# Patient Record
Sex: FEMALE | Race: WHITE | NOT HISPANIC OR LATINO | Employment: OTHER | URBAN - METROPOLITAN AREA
[De-identification: names, ages, dates, MRNs, and addresses within clinical notes are randomized per-mention and may not be internally consistent; named-entity substitution may affect disease eponyms.]

---

## 2018-07-05 ENCOUNTER — HISTORICAL (OUTPATIENT)
Dept: LAB | Facility: HOSPITAL | Age: 63
End: 2018-07-05

## 2018-07-05 ENCOUNTER — HISTORICAL (OUTPATIENT)
Dept: ADMINISTRATIVE | Facility: HOSPITAL | Age: 63
End: 2018-07-05

## 2018-07-05 LAB
ABS NEUT (OLG): 2.7
ALBUMIN SERPL-MCNC: 5 GM/DL (ref 3.4–5)
ALBUMIN/GLOB SERPL: 2.08 {RATIO} (ref 1.5–2.5)
ALP SERPL-CCNC: 43 UNIT/L (ref 38–126)
ALT SERPL-CCNC: 78 UNIT/L (ref 7–52)
APPEARANCE, UA: CLEAR
AST SERPL-CCNC: 37 UNIT/L (ref 15–37)
BACTERIA #/AREA URNS AUTO: ABNORMAL /HPF
BILIRUB SERPL-MCNC: 0.7 MG/DL (ref 0.2–1)
BILIRUB UR QL STRIP: NEGATIVE MG/DL
BILIRUBIN DIRECT+TOT PNL SERPL-MCNC: 0.1 MG/DL (ref 0–0.5)
BILIRUBIN DIRECT+TOT PNL SERPL-MCNC: 0.6 MG/DL
BUN SERPL-MCNC: 12 MG/DL (ref 7–18)
CALCIUM SERPL-MCNC: 9.7 MG/DL (ref 8.5–10)
CHLORIDE SERPL-SCNC: 102 MMOL/L (ref 98–107)
CHOLEST SERPL-MCNC: 182 MG/DL (ref 0–200)
CHOLEST/HDLC SERPL: 3.7 {RATIO}
CO2 SERPL-SCNC: 29 MMOL/L (ref 21–32)
COLOR UR: YELLOW
CREAT SERPL-MCNC: 0.89 MG/DL (ref 0.6–1.3)
ERYTHROCYTE [DISTWIDTH] IN BLOOD BY AUTOMATED COUNT: 12 % (ref 11.5–17)
GLOBULIN SER-MCNC: 2.4 GM/DL (ref 1.2–3)
GLUCOSE (UA): NEGATIVE MG/DL
GLUCOSE SERPL-MCNC: 109 MG/DL (ref 74–106)
HAV IGM SERPL QL IA: NEGATIVE
HBV CORE IGM SERPL QL IA: NEGATIVE
HBV SURFACE AG SERPL QL IA: NEGATIVE
HCT VFR BLD AUTO: 43.7 % (ref 37–47)
HCV AB SERPL QL IA: NEGATIVE
HDLC SERPL-MCNC: 49 MG/DL (ref 35–60)
HEPATITIS PANEL INTERP: NORMAL
HGB BLD-MCNC: 14.8 GM/DL (ref 12–16)
HGB UR QL STRIP: ABNORMAL UNIT/L
KETONES UR QL STRIP: NEGATIVE MG/DL
LDLC SERPL CALC-MCNC: 104 MG/DL (ref 0–129)
LEUKOCYTE ESTERASE UR QL STRIP: ABNORMAL UNIT/L
LYMPHOCYTES # BLD AUTO: 2.5 X10(3)/MCL (ref 0.6–3.4)
LYMPHOCYTES NFR BLD AUTO: 42.6 % (ref 13–40)
MCH RBC QN AUTO: 30.6 PG (ref 27–31.2)
MCHC RBC AUTO-ENTMCNC: 34 GM/DL (ref 32–36)
MCV RBC AUTO: 90 FL (ref 80–94)
MONOCYTES # BLD AUTO: 0.6 X10(3)/MCL (ref 0–1.8)
MONOCYTES NFR BLD AUTO: 11.1 % (ref 0.1–24)
NEUTROPHILS NFR BLD AUTO: 46.3 % (ref 47–80)
NITRITE UR QL STRIP.AUTO: NEGATIVE
PH UR STRIP: 5.5 [PH]
PLATELET # BLD AUTO: 252 X10(3)/MCL (ref 130–400)
PMV BLD AUTO: 8.9 FL
POTASSIUM SERPL-SCNC: 4.4 MMOL/L (ref 3.5–5.1)
PROT SERPL-MCNC: 7.4 GM/DL (ref 6.4–8.2)
PROT UR QL STRIP: NEGATIVE MG/DL
RBC # BLD AUTO: 4.83 X10(6)/MCL (ref 4.2–5.4)
RBC #/AREA URNS HPF: ABNORMAL /HPF
SODIUM SERPL-SCNC: 141 MMOL/L (ref 136–145)
SP GR UR STRIP: 1.01
SQUAMOUS EPITHELIAL, UA: ABNORMAL /LPF
TRIGL SERPL-MCNC: 112 MG/DL (ref 30–150)
TSH SERPL-ACNC: 2.92 MIU/ML (ref 0.35–4.94)
UROBILINOGEN UR STRIP-ACNC: 0.2 MG/DL
VLDLC SERPL CALC-MCNC: 22.4 MG/DL
WBC # SPEC AUTO: 5.8 X10(3)/MCL (ref 4.5–11.5)
WBC #/AREA URNS AUTO: ABNORMAL /[HPF]

## 2018-11-06 ENCOUNTER — HISTORICAL (OUTPATIENT)
Dept: ADMINISTRATIVE | Facility: HOSPITAL | Age: 63
End: 2018-11-06

## 2018-11-06 LAB
ALBUMIN SERPL-MCNC: 4.4 GM/DL (ref 3.4–5)
ALBUMIN/GLOB SERPL: 2 {RATIO} (ref 1.5–2.5)
ALP SERPL-CCNC: 46 UNIT/L (ref 38–126)
ALT SERPL-CCNC: 57 UNIT/L (ref 7–52)
AST SERPL-CCNC: 31 UNIT/L (ref 15–37)
BILIRUB SERPL-MCNC: 0.7 MG/DL (ref 0.2–1)
BILIRUBIN DIRECT+TOT PNL SERPL-MCNC: 0.2 MG/DL (ref 0–0.5)
BILIRUBIN DIRECT+TOT PNL SERPL-MCNC: 0.5 MG/DL
BUN SERPL-MCNC: 13 MG/DL (ref 7–18)
CALCIUM SERPL-MCNC: 9.3 MG/DL (ref 8.5–10)
CHLORIDE SERPL-SCNC: 104 MMOL/L (ref 98–107)
CHOLEST SERPL-MCNC: 161 MG/DL (ref 0–200)
CHOLEST/HDLC SERPL: 3 {RATIO}
CO2 SERPL-SCNC: 28 MMOL/L (ref 21–32)
CREAT SERPL-MCNC: 0.93 MG/DL (ref 0.6–1.3)
EST. AVERAGE GLUCOSE BLD GHB EST-MCNC: 120 MG/DL
GLOBULIN SER-MCNC: 2.2 GM/DL (ref 1.2–3)
GLUCOSE SERPL-MCNC: 105 MG/DL (ref 74–106)
HBA1C MFR BLD: 5.8 % (ref 4.4–6.4)
HDLC SERPL-MCNC: 53 MG/DL (ref 35–60)
LDLC SERPL CALC-MCNC: 85 MG/DL (ref 0–129)
POTASSIUM SERPL-SCNC: 4.3 MMOL/L (ref 3.5–5.1)
PROT SERPL-MCNC: 6.6 GM/DL (ref 6.4–8.2)
SODIUM SERPL-SCNC: 140 MMOL/L (ref 136–145)
TRIGL SERPL-MCNC: 113 MG/DL (ref 30–150)
VLDLC SERPL CALC-MCNC: 22.6 MG/DL

## 2019-01-09 LAB — CRC RECOMMENDATION EXT: NORMAL

## 2019-07-02 ENCOUNTER — HISTORICAL (OUTPATIENT)
Dept: ADMINISTRATIVE | Facility: HOSPITAL | Age: 64
End: 2019-07-02

## 2019-07-02 LAB
ALBUMIN SERPL-MCNC: 4.3 GM/DL (ref 3.4–5)
ALBUMIN/GLOB SERPL: 2.15 {RATIO} (ref 1.5–2.5)
ALP SERPL-CCNC: 44 UNIT/L (ref 38–126)
ALT SERPL-CCNC: 72 UNIT/L (ref 7–52)
AST SERPL-CCNC: 35 UNIT/L (ref 15–37)
BILIRUB SERPL-MCNC: 0.6 MG/DL (ref 0.2–1)
BILIRUBIN DIRECT+TOT PNL SERPL-MCNC: 0.1 MG/DL (ref 0–0.5)
BILIRUBIN DIRECT+TOT PNL SERPL-MCNC: 0.5 MG/DL
BUN SERPL-MCNC: 14 MG/DL (ref 7–18)
CALCIUM SERPL-MCNC: 9.3 MG/DL (ref 8.5–10)
CHLORIDE SERPL-SCNC: 104 MMOL/L (ref 98–107)
CHOLEST SERPL-MCNC: 172 MG/DL (ref 0–200)
CHOLEST/HDLC SERPL: 3.9 {RATIO}
CO2 SERPL-SCNC: 30 MMOL/L (ref 21–32)
CREAT SERPL-MCNC: 0.88 MG/DL (ref 0.6–1.3)
EST. AVERAGE GLUCOSE BLD GHB EST-MCNC: 120 MG/DL
GLOBULIN SER-MCNC: 2 GM/DL (ref 1.2–3)
GLUCOSE SERPL-MCNC: 109 MG/DL (ref 74–106)
HBA1C MFR BLD: 5.8 % (ref 4.4–6.4)
HDLC SERPL-MCNC: 44 MG/DL (ref 35–60)
LDLC SERPL CALC-MCNC: 98 MG/DL (ref 0–129)
POTASSIUM SERPL-SCNC: 4.6 MMOL/L (ref 3.5–5.1)
PROT SERPL-MCNC: 6.3 GM/DL (ref 6.4–8.2)
SODIUM SERPL-SCNC: 141 MMOL/L (ref 136–145)
TRIGL SERPL-MCNC: 140 MG/DL (ref 30–150)
VLDLC SERPL CALC-MCNC: 28 MG/DL

## 2020-08-31 ENCOUNTER — HISTORICAL (OUTPATIENT)
Dept: ADMINISTRATIVE | Facility: HOSPITAL | Age: 65
End: 2020-08-31

## 2020-08-31 LAB
ALBUMIN SERPL-MCNC: 4.8 GM/DL (ref 3.4–5)
ALBUMIN/GLOB SERPL: 2.18 {RATIO} (ref 1.5–2.5)
ALP SERPL-CCNC: 63 UNIT/L (ref 38–126)
ALT SERPL-CCNC: 80 UNIT/L (ref 7–52)
AST SERPL-CCNC: 39 UNIT/L (ref 15–37)
BILIRUB SERPL-MCNC: 0.5 MG/DL (ref 0.2–1)
BILIRUBIN DIRECT+TOT PNL SERPL-MCNC: 0.1 MG/DL (ref 0–0.5)
BILIRUBIN DIRECT+TOT PNL SERPL-MCNC: 0.4 MG/DL
BUN SERPL-MCNC: 14 MG/DL (ref 7–18)
CALCIUM SERPL-MCNC: 9.7 MG/DL (ref 8.5–10)
CHLORIDE SERPL-SCNC: 100 MMOL/L (ref 98–107)
CHOLEST SERPL-MCNC: 172 MG/DL (ref 0–200)
CHOLEST/HDLC SERPL: 3.7 {RATIO}
CO2 SERPL-SCNC: 26 MMOL/L (ref 21–32)
CREAT SERPL-MCNC: 0.82 MG/DL (ref 0.6–1.3)
GLOBULIN SER-MCNC: 2.2 GM/DL (ref 1.2–3)
GLUCOSE SERPL-MCNC: 103 MG/DL (ref 74–106)
HDLC SERPL-MCNC: 47 MG/DL (ref 35–60)
LDLC SERPL CALC-MCNC: 106 MG/DL (ref 0–129)
POTASSIUM SERPL-SCNC: 4.2 MMOL/L (ref 3.5–5.1)
PROT SERPL-MCNC: 7 GM/DL (ref 6.4–8.2)
SODIUM SERPL-SCNC: 137 MMOL/L (ref 136–145)
TRIGL SERPL-MCNC: 310 MG/DL (ref 30–150)
VLDLC SERPL CALC-MCNC: 62 MG/DL

## 2020-10-12 ENCOUNTER — HISTORICAL (OUTPATIENT)
Dept: ADMINISTRATIVE | Facility: HOSPITAL | Age: 65
End: 2020-10-12

## 2020-10-12 LAB
ALBUMIN SERPL-MCNC: 4.7 GM/DL (ref 3.4–5)
ALBUMIN/GLOB SERPL: 2.04 {RATIO} (ref 1.5–2.5)
ALP SERPL-CCNC: 116 UNIT/L (ref 38–126)
ALT SERPL-CCNC: 60 UNIT/L (ref 7–52)
AST SERPL-CCNC: 34 UNIT/L (ref 15–37)
BILIRUB SERPL-MCNC: 0.6 MG/DL (ref 0.2–1)
BILIRUBIN DIRECT+TOT PNL SERPL-MCNC: 0.2 MG/DL (ref 0–0.5)
BILIRUBIN DIRECT+TOT PNL SERPL-MCNC: 0.4 MG/DL
BUN SERPL-MCNC: 13 MG/DL (ref 7–18)
CALCIUM SERPL-MCNC: 9.9 MG/DL (ref 8.5–10)
CHLORIDE SERPL-SCNC: 101 MMOL/L (ref 98–107)
CO2 SERPL-SCNC: 27 MMOL/L (ref 21–32)
CREAT SERPL-MCNC: 0.87 MG/DL (ref 0.6–1.3)
GLOBULIN SER-MCNC: 2.3 GM/DL (ref 1.2–3)
GLUCOSE SERPL-MCNC: 122 MG/DL (ref 74–106)
POTASSIUM SERPL-SCNC: 4.2 MMOL/L (ref 3.5–5.1)
PROT SERPL-MCNC: 7 GM/DL (ref 6.4–8.2)
SODIUM SERPL-SCNC: 138 MMOL/L (ref 136–145)

## 2021-03-31 ENCOUNTER — HISTORICAL (OUTPATIENT)
Dept: ADMINISTRATIVE | Facility: HOSPITAL | Age: 66
End: 2021-03-31

## 2021-03-31 LAB
ALBUMIN SERPL-MCNC: 4.5 GM/DL (ref 3.4–5)
ALBUMIN/GLOB SERPL: 2.25 {RATIO} (ref 1.5–2.5)
ALP SERPL-CCNC: 57 UNIT/L (ref 38–126)
ALT SERPL-CCNC: 48 UNIT/L (ref 7–52)
AST SERPL-CCNC: 30 UNIT/L (ref 15–37)
BILIRUB SERPL-MCNC: 0.7 MG/DL (ref 0.2–1)
BILIRUBIN DIRECT+TOT PNL SERPL-MCNC: 0.2 MG/DL (ref 0–0.5)
BILIRUBIN DIRECT+TOT PNL SERPL-MCNC: 0.5 MG/DL
BUN SERPL-MCNC: 15 MG/DL (ref 7–18)
CALCIUM SERPL-MCNC: 9.6 MG/DL (ref 8.5–10)
CHLORIDE SERPL-SCNC: 104 MMOL/L (ref 98–107)
CHOLEST SERPL-MCNC: 171 MG/DL (ref 0–200)
CHOLEST/HDLC SERPL: 3.2 {RATIO}
CO2 SERPL-SCNC: 30 MMOL/L (ref 21–32)
CREAT SERPL-MCNC: 0.96 MG/DL (ref 0.6–1.3)
GLOBULIN SER-MCNC: 2 GM/DL (ref 1.2–3)
GLUCOSE SERPL-MCNC: 107 MG/DL (ref 74–106)
HDLC SERPL-MCNC: 53 MG/DL (ref 35–60)
LDLC SERPL CALC-MCNC: 96 MG/DL (ref 0–129)
POTASSIUM SERPL-SCNC: 4.8 MMOL/L (ref 3.5–5.1)
PROT SERPL-MCNC: 6.5 GM/DL (ref 6.4–8.2)
SODIUM SERPL-SCNC: 140 MMOL/L (ref 136–145)
TRIGL SERPL-MCNC: 129 MG/DL (ref 30–150)
VLDLC SERPL CALC-MCNC: 25.8 MG/DL

## 2021-09-17 ENCOUNTER — HISTORICAL (OUTPATIENT)
Dept: ADMINISTRATIVE | Facility: HOSPITAL | Age: 66
End: 2021-09-17

## 2021-09-17 LAB
ALBUMIN SERPL-MCNC: 4.4 GM/DL (ref 3.4–5)
ALBUMIN/GLOB SERPL: 2 {RATIO} (ref 1.5–2.5)
ALP SERPL-CCNC: 49 UNIT/L (ref 38–126)
ALT SERPL-CCNC: 54 UNIT/L (ref 7–52)
AST SERPL-CCNC: 30 UNIT/L (ref 15–37)
BILIRUB SERPL-MCNC: 0.6 MG/DL (ref 0.2–1)
BILIRUBIN DIRECT+TOT PNL SERPL-MCNC: 0.1 MG/DL (ref 0–0.5)
BILIRUBIN DIRECT+TOT PNL SERPL-MCNC: 0.5 MG/DL
BUN SERPL-MCNC: 14 MG/DL (ref 7–18)
CALCIUM SERPL-MCNC: 9.7 MG/DL (ref 8.5–10)
CHLORIDE SERPL-SCNC: 103 MMOL/L (ref 98–107)
CHOLEST SERPL-MCNC: 172 MG/DL (ref 0–200)
CHOLEST/HDLC SERPL: 3.7 {RATIO}
CO2 SERPL-SCNC: 31 MMOL/L (ref 21–32)
CREAT SERPL-MCNC: 0.97 MG/DL (ref 0.6–1.3)
GLOBULIN SER-MCNC: 2.2 GM/DL (ref 1.2–3)
GLUCOSE SERPL-MCNC: 109 MG/DL (ref 74–106)
HDLC SERPL-MCNC: 46 MG/DL (ref 35–60)
LDLC SERPL CALC-MCNC: 101 MG/DL (ref 0–129)
POTASSIUM SERPL-SCNC: 4.7 MMOL/L (ref 3.5–5.1)
PROT SERPL-MCNC: 6.6 GM/DL (ref 6.4–8.2)
SODIUM SERPL-SCNC: 141 MMOL/L (ref 136–145)
TRIGL SERPL-MCNC: 131 MG/DL (ref 30–150)
VLDLC SERPL CALC-MCNC: 26.2 MG/DL

## 2021-11-24 ENCOUNTER — HISTORICAL (OUTPATIENT)
Dept: ADMINISTRATIVE | Facility: HOSPITAL | Age: 66
End: 2021-11-24

## 2021-11-24 LAB
ABS NEUT (OLG): 2.5 X10(3)/MCL (ref 2.1–9.2)
APPEARANCE, UA: CLEAR
BACTERIA #/AREA URNS AUTO: NORMAL /HPF
BILIRUB UR QL STRIP: NEGATIVE MG/DL
COLOR UR: YELLOW
ERYTHROCYTE [DISTWIDTH] IN BLOOD BY AUTOMATED COUNT: 11.8 % (ref 11.5–17)
GLUCOSE (UA): NEGATIVE MG/DL
HCT VFR BLD AUTO: 38.6 % (ref 37–47)
HGB BLD-MCNC: 12.7 GM/DL (ref 12–16)
HGB UR QL STRIP: NEGATIVE UNIT/L
KETONES UR QL STRIP: NEGATIVE MG/DL
LEUKOCYTE ESTERASE UR QL STRIP: NORMAL UNIT/L
LYMPHOCYTES # BLD AUTO: 2 X10(3)/MCL (ref 0.6–3.4)
LYMPHOCYTES NFR BLD AUTO: 42.5 % (ref 13–40)
MCH RBC QN AUTO: 30.2 PG (ref 27–31.2)
MCHC RBC AUTO-ENTMCNC: 33 GM/DL (ref 32–36)
MCV RBC AUTO: 92 FL (ref 80–94)
MONOCYTES # BLD AUTO: 0.2 X10(3)/MCL (ref 0.1–1.3)
MONOCYTES NFR BLD AUTO: 4.4 % (ref 0.1–24)
NEUTROPHILS NFR BLD AUTO: 53.1 % (ref 47–80)
NITRITE UR QL STRIP.AUTO: NEGATIVE
PH UR STRIP: 5.5 [PH]
PLATELET # BLD AUTO: 223 X10(3)/MCL (ref 130–400)
PMV BLD AUTO: 8.9 FL (ref 9.4–12.4)
PROT UR QL STRIP: NEGATIVE MG/DL
RBC # BLD AUTO: 4.21 X10(6)/MCL (ref 4.2–5.4)
RBC #/AREA URNS HPF: NORMAL /HPF
SP GR UR STRIP: 1.01
SQUAMOUS EPITHELIAL, UA: NORMAL /LPF
UROBILINOGEN UR STRIP-ACNC: 0.2 MG/DL
WBC # SPEC AUTO: 4.7 X10(3)/MCL (ref 4.5–11.5)
WBC #/AREA URNS AUTO: NORMAL /[HPF]

## 2022-04-10 ENCOUNTER — HISTORICAL (OUTPATIENT)
Dept: ADMINISTRATIVE | Facility: HOSPITAL | Age: 67
End: 2022-04-10

## 2022-04-11 ENCOUNTER — HISTORICAL (OUTPATIENT)
Dept: ADMINISTRATIVE | Facility: HOSPITAL | Age: 67
End: 2022-04-11

## 2022-04-28 VITALS
SYSTOLIC BLOOD PRESSURE: 120 MMHG | BODY MASS INDEX: 29.24 KG/M2 | WEIGHT: 175.5 LBS | DIASTOLIC BLOOD PRESSURE: 70 MMHG | HEIGHT: 65 IN

## 2022-04-28 VITALS
BODY MASS INDEX: 29.24 KG/M2 | SYSTOLIC BLOOD PRESSURE: 120 MMHG | HEIGHT: 65 IN | WEIGHT: 175.5 LBS | DIASTOLIC BLOOD PRESSURE: 70 MMHG

## 2022-05-03 NOTE — HISTORICAL [facility] CERNER
This is a historical note converted from Nas. Formatting and pictures may have been removed.  Please reference Nas for original formatting and attached multimedia. Chief Complaint  wellness  History of Present Illness  She had labs last year with an ALT of 72, glucose 109, and an LDL of 98.  had L4-5 microdiscectomy July 16th, with dr riley but it made it worse;  woke up from anesthesia ?with numbness right heel and calf,? the calf is improving;  had follow up MRI that showed a bone spur so getting fusion of L3 to L5  no diet or exercise  Elmiron helping a LOT with IC, takes it bid but Rx still says tid (up to date with eye exams)  up to date with [facility]/gyn  home BPs have been high off Benicar  Review of Systems  ?14 point Review of Systems performed with no exceptions for new complaints other than as noted in HPI.  Physical Exam  Vitals & Measurements  HR:?88(Peripheral)?  HT:?165.10?cm? HT:?165.1?cm? WT:?78.100?kg? WT:?78.1?kg? BMI:?28.65?  ?  PHYSICAL EXAM  ?   WDWN patient in NAD, ?AFVSS  HEENT - no acute abnormality  ??????????????? oropharynx WNL  HEART - RRR  LUNGS -? CTA  ABDOMEN - benign, NTND;? no peritoneal signs  EXTREMITIES - No CCE  SKIN - warm, dry, intact  PSYCH - affect appropriate;? alert and oriented  NEURO- no new deficits noted;? cranial nerves grossly intact  neg SLR  Assessment/Plan  1.?Wellness examination?Z00.00  ?work on diet? and exercise  up to date with screenings  Ordered:  Clinic Follow up, *Est. 08/31/21 3:00:00 CDT, PLEASE MAKE THIS A 30 MINUTE PHYSICAL, Order for future visit, Wellness examination, [facility]  Medicare Annual Wellness- Subsequent  PC, Wellness examination, [facility]LINK AMB - AFP, 08/31/20 14:15:00 CDT  ?  2.?HLD (hyperlipidemia)?E78.5  ?recheck FLP  Ordered:  Comprehensive Metabolic Panel, Routine collect, 08/31/20 14:15:00 CDT, Blood, Order for future visit, Stop date 08/31/20 14:15:00 CDT, Lab Collect, HLD (hyperlipidemia), 08/31/20 14:15:00 CDT  Lab  Collection Request, 08/31/20 14:15:00 CDT, HLINK AMB - [facility], 08/31/20 14:15:00 CDT, HLD (hyperlipidemia)  Lipid Panel, Routine collect, 08/31/20 14:15:00 CDT, Blood, Order for future visit, Stop date 08/31/20 14:15:00 CDT, Lab Collect, HLD (hyperlipidemia), 08/31/20 14:15:00 CDT  Office/Outpatient Visit Level 2 Established [id number] PC, HLD (hyperlipidemia)  HTN (hypertension)  IC (interstitial cystitis)  NAFLD (nonalcoholic fatty liver disease), [facility] AMB - [facility], 08/31/20 14:15:00 CDT  ?  3.?HTN (hypertension)?I10  ?restart Benicar 20  Ordered:  Clinic Follow up, *Est. 10/12/20 3:00:00 CDT, Order for future visit, HTN (hypertension), [facility]Link [facility]  Office/Outpatient Visit Level 2 Established [id number] PC, HLD (hyperlipidemia)  HTN (hypertension)  IC (interstitial cystitis)  NAFLD (nonalcoholic fatty liver disease), [facility] AMB - [facility], 08/31/20 14:15:00 CDT  ?  4.?Insomnia?G47.00  ?sees dr charles?who gives her Ambien  ?  5.?IC (interstitial cystitis)?N30.10  ?much better with elmiron bid  shell keep f/u with eye doctor  Ordered:  Office/Outpatient Visit Level 2 Established [id number] PC, HLD (hyperlipidemia)  HTN (hypertension)  IC (interstitial cystitis)  NAFLD (nonalcoholic fatty liver disease), [facility] AMB - [facility], 08/31/20 14:15:00 CDT  ?  6.?NAFLD (nonalcoholic fatty liver disease)?K76.0  ?Recheck liver enzymes  Ordered:  Office/Outpatient Visit Level 2 Established [id number] PC, HLD (hyperlipidemia)  HTN (hypertension)  IC (interstitial cystitis)  NAFLD (nonalcoholic fatty liver disease), [facility] AMB - [facility], 08/31/20 14:15:00 CDT  ?  7.?Paresthesias?R20.2  ?keep f/u with spine specialist, may consider second opinion  ?  Have her come back in 4 to 6 weeks to recheck her pressure (she will probably be getting her?spine surgery within a month)  Referrals  Clinic Follow up, *Est. 08/31/21 3:00:00 CDT, PLEASE MAKE THIS A 30 MINUTE PHYSICAL, Order for future visit, Wellness examination, [facility]Link [facility]  Clinic Follow up, *Est. 10/12/20  10:30:00 CDT, Order for future visit, HTN (hypertension), [facility]Link [facility]   Problem List/Past Medical History  Ongoing  Gastro-esophageal reflux  HLD (hyperlipidemia)  HTN (hypertension)  IC (interstitial cystitis)  Insomnia  Left tennis elbow  NAFLD (nonalcoholic fatty liver disease)  Osteoarthritis  Wellness examination  Historical  Shingles  Procedure/Surgical History  History of lumbar epidural steroid injection (04/14/2020)  Carpal tunnel release (03/12/2020)  lap Nissen (2016)  Abdominoplasty  left knee meniscus repair   Medications  aspirin 81 mg oral Delayed Release (EC) tablet, 81 mg= 1 tab(s), Oral, Daily  atorvastatin 20 mg oral tablet, See Instructions  Elmiron 100 mg oral capsule, See Instructions  hydroCHLOROthiazide 12.5 mg oral capsule, See Instructions  Multivitamins and Minerals  naproxen 500 mg oral tablet, See Instructions  Poison Ivy Vaccine, See Instructions  YUVAFEM TAB 10MCG  ZOLPIDEM ER TAB 6.25MG, 6.25 mg= 1 tab(s), Oral, qPM  Allergies  penicillin?(yeast infection)  Social History  Abuse/Neglect  No, 08/31/2020  Alcohol  Never, 06/06/2018  Employment/School  Retired, Work/School description: educator., 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 Living situation: Home/Independent., 06/06/2018  Substance Use  Never, 06/06/2018  Tobacco  Never (less than 100 in lifetime), N/A, 08/31/2020  Family History  Alcoholism.: Father.  CAD - Coronary artery disease: Mother and Brother.  Diabetes mellitus type 2: Mother.  Immunizations  Vaccine Date Status   zoster vaccine, inactivated 09/18/2019 Recorded   pneumococcal 23-polyvalent vaccine 07/08/2019 Given   pneumococcal 13-valent conjugate vaccine 07/05/2018 Given   tetanus/diphth/pertuss (Tdap) adult/adol 02/25/2014 Recorded   influenza virus vaccine, inactivated 09/10/2013 Recorded   Health Maintenance  Health Maintenance  ???Pending?(in the next year)  ??? ??OverDue  ??? ? ? ?Pneumococcal Vaccine due??and every?  ??? ? ? ?Aspirin Therapy for CVD  Prevention due??07/05/19??and every 1??year(s)  ??? ? ? ?Breast Cancer Screening due??07/20/19??and every 2??year(s)  ??? ? ? ?Advance Directive due??01/02/20??and every 1??year(s)  ??? ? ? ?Alcohol Misuse Screening due??01/02/20??and every 1??year(s)  ??? ? ? ?Cognitive Screening due??01/02/20??and every 1??year(s)  ??? ? ? ?Fall Risk Assessment due??01/02/20??and every 1??year(s)  ??? ? ? ?Functional Assessment due??01/02/20??and every 1??year(s)  ??? ? ? ?Hypertension Management-BMP due??07/01/20??and every 1??year(s)  ??? ??Due?  ??? ? ? ?Diabetes Screening due??07/01/20??and every 1??year(s)  ??? ? ? ?ADL Screening due??08/31/20??and every 1??year(s)  ??? ? ? ?Bone Density Screening due??08/31/20??Variable frequency  ??? ? ? ?Depression Screening due??08/31/20??and every?  ??? ? ? ?Hypertension Management-Education due??08/31/20??and every 1??year(s)  ??? ? ? ?Influenza Vaccine due??08/31/20??and every?  ??? ? ? ?Zoster Vaccine due??08/31/20??and every?  ??? ??Due In Future?  ??? ? ? ?Obesity Screening not due until??01/01/21??and every 1??year(s)  ??? ? ? ?Blood Pressure Screening not due until??06/10/21??and every 1??year(s)  ??? ? ? ?Hypertension Management-Blood Pressure not due until??06/10/21??and every 1??year(s)  ???Satisfied?(in the past 1 year)  ??? ??Satisfied?  ??? ? ? ?Blood Pressure Screening on??06/10/20.??Satisfied by Val Remy MA  ??? ? ? ?Body Mass Index Check on??08/31/20.??Satisfied by Isabell Dumas LPN  ??? ? ? ?Diabetes Screening on??08/31/20.??Satisfied by Lucas Miller  ??? ? ? ?Hypertension Management-BMP on??08/31/20.??Satisfied by Lucas Miller  ??? ? ? ?Lipid Screening on??08/31/20.??Satisfied by Lucas Milelr  ??? ? ? ?Medicare Annual Wellness Exam on??08/31/20.??Satisfied by Ck Meadows MD  ??? ? ? ?Obesity Screening on??08/31/20.??Satisfied by Isabell Dumas LPN  ??? ? ? ?Zoster Vaccine on??09/18/19.??Satisfied by Isabell Dumas LPN  ?

## 2022-05-03 NOTE — HISTORICAL [facility]
This is a historical note converted from [employer]ner. Formatting and pictures may have been removed.  Please reference [employer]ner for original formatting and attached multimedia. Chief Complaint  recheck meds  History of Present Illness  The patient is a [62] year old [?female here today for a complete Wellness Physical exam. The patient has [one] acute complaints today. The patient also carries a diagnosis of [HTN, BETTS, Elevated Cholesterol, IC, OA], which is assessed today. Exercise is reported as [none] and includes [nothing at present]. Monitors diet on a daily basis. Previous documented weight at last office visit is [158].? + weight gain noted- states has been trying to get back into work out routine, however has been difficulty, has not exercised in 2 years due to recent surgeries and just hasnt got back into he routine yet, however after today, she states she plans to really push herself into doing so. Overall feeling well, has been having neck pain for the past several years, however for the past few month she feels like her left hand gets tingly off an on depending on certain positions. 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  Review of Systems  Constitutional:?weight gain,?no weight loss,?no fatigue,?no fever,?no chills,?no weakness,?trouble sleeping.- Ambien helps when needed  Eyes:?no vision loss/changes,?glasses or contacts,?no pain,?no redness,?no blurry or double vision,?no flashing lights,?no specks,?no glaucoma,?no cataracts.  Last eye exam:?within last 6 months  Head:?no headache,?no head injury,?neck pain.?  Neck:??no lumps,?no swollen glands,?no stiffness.  Ears:?no decreased hearing,?no ringing,?no earache,?no drainage.?  Nose:?no stuffiness,?no discharge,?no itching,?no hay fever,?no nosebleeds,?no sinus pain.  Throat:?no bleeding,?no dentures,?no sore tongue,?no dry mouth,?no sore throat,?no hoarseness,?no  thrush,?no non-healing sores.  Cardiovascular:?no chest pain or discomfort,?no tightness,?no palpitations,?no SOB with activity,?no difficulty breathing while supine,?no swelling,?no sudden awakening from sleep with SOB.  Vascular:?no calf pain with walking,?no leg cramping.  Respiratory:??no cough,?no sputum,?no coughing up blood,?no SOB,?no wheezing,?no painful breathing.  Gastrointestinal:?no swallowing difficulty,?heartburn,?no change in appetite,?no nausea,?no change in bowel habits,?no rectal bleeding,?no constipation,?no diarrhea,?no yellow eyes or skin.  Urinary:?no frequency,?no urgency,?no burning or pain,?no blood in urine,?no incontinence,?no change in urinary strength.  Musculoskeletal:?no muscle or joint pain,?stiffness,?no back pain,?no redness of joints,?no swelling of joints,?no trauma.  Skin:?no rashes,?no lumps,?no itching,?no dryness,?color normal for ethnicity,?no hair or nail changes.  Neurologic:?no dizziness,?no fainting,?no seizures,?no weakness,?no numbness,?tingling,- right hand?comes and goes ?no tremors.  Psychiatric:?no nervousness,?no stress,?no depression,?no memory loss.  Endocrine:?no heat or cold intolerance,?no sweating,?no frequent urination,?no thirst,?no change in appetite.  Hematologic:?no ease of bruising,?  ?  ?  Physical Exam  Vitals & Measurements  HR:?70(Peripheral)? BP:?126/80?  HT:?165.1?cm? HT:?165.1?cm? HT:?165.1?cm? WT:?76.7?kg? WT:?76.7?kg? WT:?76.7?kg? BMI:?28.14?  General- In NAD, A&O x 4  ?   Eye- PERRL, EOMI  ?   HENT- TM/EAC clear, Nose mucosa WNL, No D/C, No Sinus Tenderness, O/P without erythema or exudates?  ?   Neck- S, No LA, No Thyromegaly, No bruits, No JVD  ?   Respiratory- CTA, No wheezing, No crackles, No rhonchi  ?   Cardiovascular- RRR W/O MGR, Pulses equal throughout  ?   Gastrointestinal- S, NT, No HSM, NABS, No masses, No peritoneal signs?  ?   Lymphatics- WNL  ?  Musculoskeletal- No tenderness, Joints WNL, FROM, Neg SLR, No CCE, no tenderness  noted to neck, FROM noted  ?  Integumentary- Warm, dry, intact, No lesions/rashes/hives  ?  Neurologic- No Motor/Sensory deficits, Reflexes +2 throughout, CN II-XII intact, Neg cerebellar tests  ?  ?  Assessment/Plan  1.?Wellness examination  ?1. Wellness  - Health and Exercise? ?educated upon  -10% weight loss goal  -Lifestyle counseling > 20 minutes  -Diet: Low Na+, avoid fried greasy foods  -Screening: Colon due- will refer, GYN due at the end of the month for pap and mammo- has apt scheduled already  -Vaccines: TDap UTD, Prevnar today- will inquire about Shingrix at pharmacy as we are out  -LabS: CBC, CMP, TSH, LIPIDS, UA  ?   2. Comorbidities- mentioned  ?   3. Referrals- GI for repeat colon  ?   4. RTC 6months sooner if needed  Ordered:  pneumococcal 13-valent conjugate vaccine, 0.5 mL, form: Injection, IM, Once, first dose 07/05/18 14:21:00 CDT, stop date 07/05/18 14:21:00 CDT  CBC w/ Auto Diff, Routine collect, 07/05/18 14:21:00 CDT, Blood, Order for future visit, Stop date 07/05/18 14:21:00 CDT, Lab Collect, Wellness examination  HTN (hypertension)  IC (interstitial cystitis)  BETTS (nonalcoholic steatohepatitis), 07/05/18 14:21:00 CDT  Clinic Follow up, *Est. 01/05/19 3:00:00 CST, Order for future visit, Wellness examination  HTN (hypertension), HLink AFP  Comprehensive Metabolic Panel, Routine collect, 07/05/18 14:21:00 CDT, Blood, Order for future visit, Stop date 07/05/18 14:21:00 CDT, Lab Collect, Wellness examination  Elevated cholesterol  HTN (hypertension)  IC (interstitial cystitis)  BETTS (nonalcoholic steatohepatitis), 0...  External Referral, due for repeat colon, [facility], Dr. Patel, due for repeat colonscopy, 07/05/18 14:25:00 CDT, Wellness examination  Lab Collection Request, 07/05/18 14:21:00 CDT, [facility]LINK AMB - [facility], 07/05/18 14:21:00 CDT  Lipid Panel, Routine collect, 07/05/18 14:21:00 CDT, Blood, Order for future visit, Stop date 07/05/18 14:21:00 CDT, Lab Collect, Wellness  examination  Elevated cholesterol  HTN (hypertension)  IC (interstitial cystitis)  BETTS (nonalcoholic steatohepatitis), 0...  Preventative Health Care Est 40-64 years [id number] PC, Wellness examination, HLINK AMB - [facility], 07/05/18 14:24:00 CDT  Thyroid Stimulating Hormone, Routine collect, 07/05/18 14:21:00 CDT, Blood, Order for future visit, Stop date 07/05/18 14:21:00 CDT, Lab Collect, Wellness examination  HTN (hypertension)  IC (interstitial cystitis)  BETTS (nonalcoholic steatohepatitis), 07/05/18 14:21:00 CDT  Urinalysis Complete no reflex, Routine collect, Urine, Order for future visit, 07/05/18 14:21:00 CDT, Stop date 07/05/18 14:21:00 CDT, Nurse collect, Wellness examination  HTN (hypertension)  IC (interstitial cystitis)  BETTS (nonalcoholic steatohepatitis)  ?  2.?Elevated cholesterol  1. Diet and exercise as tolerated (TLC)  Ordered:  Comprehensive Metabolic Panel, Routine collect, 07/05/18 14:21:00 CDT, Blood, Order for future visit, Stop date 07/05/18 14:21:00 CDT, Lab Collect, Wellness examination  Elevated cholesterol  HTN (hypertension)  IC (interstitial cystitis)  BETTS (nonalcoholic steatohepatitis), 0...  Lipid Panel, Routine collect, 07/05/18 14:21:00 CDT, Blood, Order for future visit, Stop date 07/05/18 14:21:00 CDT, Lab Collect, Wellness examination  Elevated cholesterol  HTN (hypertension)  IC (interstitial cystitis)  BETTS (nonalcoholic steatohepatitis), 0...  ?  3.?HTN (hypertension)  1. Refill meds x 6 months- b/p at goal  2. Monitor b/p at home  3. Low Na+ diet  4. Exercise as tolerated for weight loss (TLC)  5. RTC in 6 months  Ordered:  CBC w/ Auto Diff, Routine collect, 07/05/18 14:21:00 CDT, Blood, Order for future visit, Stop date 07/05/18 14:21:00 CDT, Lab Collect, Wellness examination  HTN (hypertension)  IC (interstitial cystitis)  BETTS (nonalcoholic steatohepatitis), 07/05/18 14:21:00 CDT  Clinic Follow up, *Est. 01/05/19 3:00:00 CST, Order for future visit, Wellness  examination  HTN (hypertension), HLink AFP  Comprehensive Metabolic Panel, Routine collect, 07/05/18 14:21:00 CDT, Blood, Order for future visit, Stop date 07/05/18 14:21:00 CDT, Lab Collect, Wellness examination  Elevated cholesterol  HTN (hypertension)  IC (interstitial cystitis)  BETTS (nonalcoholic steatohepatitis), 0...  Lab Collection Request, 07/05/18 14:21:00 CDT, HLINK AMB - [facility], 07/05/18 14:21:00 CDT  Lipid Panel, Routine collect, 07/05/18 14:21:00 CDT, Blood, Order for future visit, Stop date 07/05/18 14:21:00 CDT, Lab Collect, Wellness examination  Elevated cholesterol  HTN (hypertension)  IC (interstitial cystitis)  BETTS (nonalcoholic steatohepatitis), 0...  Thyroid Stimulating Hormone, Routine collect, 07/05/18 14:21:00 CDT, Blood, Order for future visit, Stop date 07/05/18 14:21:00 CDT, Lab Collect, Wellness examination  HTN (hypertension)  IC (interstitial cystitis)  BETTS (nonalcoholic steatohepatitis), 07/05/18 14:21:00 CDT  Urinalysis Complete no reflex, Routine collect, Urine, Order for future visit, 07/05/18 14:21:00 CDT, Stop date 07/05/18 14:21:00 CDT, Nurse collect, Wellness examination  HTN (hypertension)  IC (interstitial cystitis)  BETTS (nonalcoholic steatohepatitis)  ?  Cervical pain  1. Cervical XRay-Cervical straightening noted- otherwise appears WNL, final reading per radiologist  2. Rest/Ice prn  3. MDP as directed  4. Naproxen 500mg po bid x 10 days  5. If no relief will consider PT eval and treat- patient will call if she decides on PT  6. F/U PRN  Ordered:  Office/Outpatient Visit Level 4 Established [id number] PC, Cervical pain, [facility]LINK AMB - [facility], 07/05/18 14:24:00 CDT  XR Spine Cervical 2 or 3 Views, Routine, 07/05/18 14:21:00 CDT, Other (please specify), None, Ambulatory, Rad Type, Cervical pain, [facility], 07/05/18 14:21:00 CDT  ?  IC (interstitial cystitis)  F/U with gyn as scheduled  Ordered:  CBC w/ Auto Diff, Routine collect, 07/05/18 14:21:00 CDT,  Blood, Order for future visit, Stop date 07/05/18 14:21:00 CDT, Lab Collect, Wellness examination  HTN (hypertension)  IC (interstitial cystitis)  BETTS (nonalcoholic steatohepatitis), 07/05/18 14:21:00 CDT  Comprehensive Metabolic Panel, Routine collect, 07/05/18 14:21:00 CDT, Blood, Order for future visit, Stop date 07/05/18 14:21:00 CDT, Lab Collect, Wellness examination  Elevated cholesterol  HTN (hypertension)  IC (interstitial cystitis)  BETTS (nonalcoholic steatohepatitis), 0...  Lab Collection Request, 07/05/18 14:21:00 CDT, HLINK AMB - AFP, 07/05/18 14:21:00 CDT  Lipid Panel, Routine collect, 07/05/18 14:21:00 CDT, Blood, Order for future visit, Stop date 07/05/18 14:21:00 CDT, Lab Collect, Wellness examination  Elevated cholesterol  HTN (hypertension)  IC (interstitial cystitis)  BETTS (nonalcoholic steatohepatitis), 0...  Thyroid Stimulating Hormone, Routine collect, 07/05/18 14:21:00 CDT, Blood, Order for future visit, Stop date 07/05/18 14:21:00 CDT, Lab Collect, Wellness examination  HTN (hypertension)  IC (interstitial cystitis)  BETTS (nonalcoholic steatohepatitis), 07/05/18 14:21:00 CDT  Urinalysis Complete no reflex, Routine collect, Urine, Order for future visit, 07/05/18 14:21:00 CDT, Stop date 07/05/18 14:21:00 CDT, Nurse collect, Wellness examination  HTN (hypertension)  IC (interstitial cystitis)  BETTS (nonalcoholic steatohepatitis)  ?  BETTS (nonalcoholic steatohepatitis)  1. Low fat/no fried/greasy foods  2. Exercise as tolerated (TLC)  Ordered:  CBC w/ Auto Diff, Routine collect, 07/05/18 14:21:00 CDT, Blood, Order for future visit, Stop date 07/05/18 14:21:00 CDT, Lab Collect, Wellness examination  HTN (hypertension)  IC (interstitial cystitis)  BETTS (nonalcoholic steatohepatitis), 07/05/18 14:21:00 CDT  Comprehensive Metabolic Panel, Routine collect, 07/05/18 14:21:00 CDT, Blood, Order for future visit, Stop date 07/05/18 14:21:00 CDT, Lab Collect, Wellness examination   Elevated cholesterol  HTN (hypertension)  IC (interstitial cystitis)  BETTS (nonalcoholic steatohepatitis), 0...  Lab Collection Request, 07/05/18 14:21:00 CDT, INK AMB - AFP, 07/05/18 14:21:00 CDT  Lipid Panel, Routine collect, 07/05/18 14:21:00 CDT, Blood, Order for future visit, Stop date 07/05/18 14:21:00 CDT, Lab Collect, Wellness examination  Elevated cholesterol  HTN (hypertension)  IC (interstitial cystitis)  BETTS (nonalcoholic steatohepatitis), 0...  Thyroid Stimulating Hormone, Routine collect, 07/05/18 14:21:00 CDT, Blood, Order for future visit, Stop date 07/05/18 14:21:00 CDT, Lab Collect, Wellness examination  HTN (hypertension)  IC (interstitial cystitis)  BETTS (nonalcoholic steatohepatitis), 07/05/18 14:21:00 CDT  Urinalysis Complete no reflex, Routine collect, Urine, Order for future visit, 07/05/18 14:21:00 CDT, Stop date 07/05/18 14:21:00 CDT, Nurse collect, Wellness examination  HTN (hypertension)  IC (interstitial cystitis)  BETTS (nonalcoholic steatohepatitis)  ?  Orders:  atorvastatin, 20 mg = 1 tab(s), Oral, At Bedtime, # 90 tab(s), 3 Refill(s), Pharmacy: [id number]  Clinic Follow-up PRN, 07/05/18 14:24:00 CDT, [facility] - [facility], Future Order   Problem List/Past Medical History  Ongoing  Elevated cholesterol  Gastro-esophageal reflux  HTN (hypertension)  IC (interstitial cystitis)  BETTS (nonalcoholic steatohepatitis)  Osteoarthritis  Wellness examination  Historical  Shingles  Procedure/Surgical History  lap Nissen (2016), Abdominoplasty, left knee meniscus repair.  Medications  aspirin 81 mg oral Delayed Release (EC) tablet, 81 mg= 1 tab(s), Oral, Daily  Elmiron 100 mg oral capsule, 100 mg= 1 cap(s), Oral, TID, 1 refills  LB hydrochlorothiazide 12.5 mg oral CAP, See Instructions, 1 refills  Lipitor 20 mg oral tablet, 20 mg= 1 tab(s), Oral, At Bedtime, 3 refills  Multivitamins and Minerals  naproxen 500 mg oral tablet, 500 mg= 1 tab(s), Oral, BID, PRN  Nexium 40  mg oral delayed release capsule, 40 mg= 1 cap(s), Oral, Daily,? ?Not taking  Prevnar 13, 0.5 mL, IM, Once  Valtrex 1 g oral tablet, 1 gm= 1 tab(s), Oral, q8hr,? ?Not taking  VESIcare 5 mg oral tablet,? 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 Living situation: Home/Independent., 06/06/2018  Substance Abuse  Never, 06/06/2018  Tobacco  Never smoker Use:., 06/06/2018  Family History  Alcoholism.: Father.  CAD - Coronary artery disease: Mother and Brother.  Diabetes mellitus type 2: Mother.  Immunizations  Vaccine Date Status   tetanus/diphth/pertuss (Tdap) adult/adol 02/25/2014 Recorded   influenza virus vaccine, inactivated 09/10/2013 Recorded   Health Maintenance  Health Maintenance  ???Pending?(in the next year)  ??? ??OverDue  ??? ? ? ?Breast Cancer Screening due??06/16/16??and every 2??year(s)  ??? ? ? ?Cervical Cancer Screening due??05/01/18??and every 5??year(s)  ??? ??Due?  ??? ? ? ?Alcohol Misuse Screening due??07/05/18??and every 1??year(s)  ??? ? ? ?Depression Screening due??07/05/18??and every 1??year(s)  ??? ? ? ?Hypertension Management-Education due??07/05/18??and every 1??year(s)  ??? ? ? ?Smoking Cessation due??07/05/18??and every 1??year(s)  ??? ? ? ?Zoster Vaccine due??07/05/18??and every 100??year(s)  ??? ??Due In Future?  ??? ? ? ?Influenza Vaccine not due until??10/02/18??and every 1??year(s)  ??? ? ? ?Hypertension Management-BMP not due until??01/04/19??and every 1??year(s)  ??? ? ? ?Hypertension Management-Blood Pressure not due until??01/05/19??and every 6??month(s)  ???Satisfied?(in the past 1 year)  ??? ??Satisfied?  ??? ? ? ?Aspirin Therapy for CVD Prevention on??07/05/18.  ??? ? ? ?Blood Pressure Screening on??07/05/18.??Satisfied by  Jessica Cui NP  ??? ? ? ?Body Mass Index Check on??07/05/18.??Satisfied by Val Remy  ??? ? ? ?Diabetes Screening on??07/05/18.??Satisfied by Jessica Cui NP  ??? ? ? ?Hypertension Management-Blood Pressure on??07/05/18.??Satisfied by Jessica Cui NP  ??? ? ? ?Lipid Screening on??01/04/18.??Satisfied by Jose Guadalupe Meadows  ??? ? ? ?Obesity Screening on??07/05/18.??Satisfied by Val Remy  ??? ? ? ?Tobacco Use Screening on??07/05/18.??Satisfied by Val Remy  ?  ?

## 2022-08-18 ENCOUNTER — DOCUMENTATION ONLY (OUTPATIENT)
Dept: ADMINISTRATIVE | Facility: HOSPITAL | Age: 67
End: 2022-08-18

## 2022-09-28 PROBLEM — K76.0 NONALCOHOLIC FATTY LIVER DISEASE: Status: ACTIVE | Noted: 2022-09-28

## 2022-09-28 PROBLEM — M19.90 OSTEOARTHRITIS: Status: ACTIVE | Noted: 2022-09-28

## 2022-09-28 PROBLEM — I10 HYPERTENSION: Status: ACTIVE | Noted: 2022-09-28

## 2022-09-28 PROBLEM — E78.5 HYPERLIPIDEMIA: Status: ACTIVE | Noted: 2022-09-28

## 2022-09-28 PROBLEM — G47.00 INSOMNIA: Status: ACTIVE | Noted: 2022-09-28

## 2022-09-28 PROBLEM — N30.10 CHRONIC INTERSTITIAL CYSTITIS: Status: ACTIVE | Noted: 2022-09-28

## 2022-09-28 PROBLEM — K21.9 GASTROESOPHAGEAL REFLUX DISEASE: Status: ACTIVE | Noted: 2022-09-28

## 2024-02-02 PROBLEM — N18.31 CHRONIC KIDNEY DISEASE, STAGE 3A: Status: ACTIVE | Noted: 2024-02-02
